# Patient Record
Sex: FEMALE | URBAN - METROPOLITAN AREA
[De-identification: names, ages, dates, MRNs, and addresses within clinical notes are randomized per-mention and may not be internally consistent; named-entity substitution may affect disease eponyms.]

---

## 2024-07-08 ENCOUNTER — LAB (OUTPATIENT)
Dept: LAB | Facility: LAB | Age: 69
End: 2024-07-08
Payer: COMMERCIAL

## 2024-07-08 DIAGNOSIS — Z01.818 ENCOUNTER FOR OTHER PREPROCEDURAL EXAMINATION: Primary | ICD-10-CM

## 2024-07-08 LAB
ANION GAP SERPL CALC-SCNC: 16 MMOL/L (ref 10–20)
BASOPHILS # BLD AUTO: 0.04 X10*3/UL (ref 0–0.1)
BASOPHILS NFR BLD AUTO: 0.6 %
BUN SERPL-MCNC: 22 MG/DL (ref 6–23)
CALCIUM SERPL-MCNC: 9.8 MG/DL (ref 8.6–10.6)
CHLORIDE SERPL-SCNC: 99 MMOL/L (ref 98–107)
CO2 SERPL-SCNC: 29 MMOL/L (ref 21–32)
CREAT SERPL-MCNC: 0.71 MG/DL (ref 0.5–1.05)
EGFRCR SERPLBLD CKD-EPI 2021: >90 ML/MIN/1.73M*2
EOSINOPHIL # BLD AUTO: 0.04 X10*3/UL (ref 0–0.7)
EOSINOPHIL NFR BLD AUTO: 0.6 %
ERYTHROCYTE [DISTWIDTH] IN BLOOD BY AUTOMATED COUNT: 12.4 % (ref 11.5–14.5)
GLUCOSE SERPL-MCNC: 90 MG/DL (ref 74–99)
HCT VFR BLD AUTO: 45.2 % (ref 36–46)
HGB BLD-MCNC: 14.5 G/DL (ref 12–16)
IMM GRANULOCYTES # BLD AUTO: 0.01 X10*3/UL (ref 0–0.7)
IMM GRANULOCYTES NFR BLD AUTO: 0.2 % (ref 0–0.9)
LYMPHOCYTES # BLD AUTO: 1.05 X10*3/UL (ref 1.2–4.8)
LYMPHOCYTES NFR BLD AUTO: 17 %
MCH RBC QN AUTO: 30.5 PG (ref 26–34)
MCHC RBC AUTO-ENTMCNC: 32.1 G/DL (ref 32–36)
MCV RBC AUTO: 95 FL (ref 80–100)
MONOCYTES # BLD AUTO: 0.36 X10*3/UL (ref 0.1–1)
MONOCYTES NFR BLD AUTO: 5.8 %
NEUTROPHILS # BLD AUTO: 4.66 X10*3/UL (ref 1.2–7.7)
NEUTROPHILS NFR BLD AUTO: 75.8 %
NRBC BLD-RTO: 0 /100 WBCS (ref 0–0)
PLATELET # BLD AUTO: 242 X10*3/UL (ref 150–450)
POTASSIUM SERPL-SCNC: 4.3 MMOL/L (ref 3.5–5.3)
RBC # BLD AUTO: 4.76 X10*6/UL (ref 4–5.2)
SODIUM SERPL-SCNC: 140 MMOL/L (ref 136–145)
WBC # BLD AUTO: 6.2 X10*3/UL (ref 4.4–11.3)

## 2024-07-08 PROCEDURE — 36415 COLL VENOUS BLD VENIPUNCTURE: CPT

## 2024-07-08 PROCEDURE — 80048 BASIC METABOLIC PNL TOTAL CA: CPT

## 2024-07-08 PROCEDURE — 85025 COMPLETE CBC W/AUTO DIFF WBC: CPT

## 2025-07-11 ENCOUNTER — OFFICE VISIT (OUTPATIENT)
Dept: URGENT CARE | Age: 70
End: 2025-07-11
Payer: MEDICARE

## 2025-07-11 VITALS
SYSTOLIC BLOOD PRESSURE: 126 MMHG | BODY MASS INDEX: 22.2 KG/M2 | RESPIRATION RATE: 16 BRPM | WEIGHT: 130 LBS | DIASTOLIC BLOOD PRESSURE: 85 MMHG | HEIGHT: 64 IN | TEMPERATURE: 98.4 F | HEART RATE: 83 BPM | OXYGEN SATURATION: 99 %

## 2025-07-11 DIAGNOSIS — S60.459A FOREIGN BODY IN SKIN OF FINGER, INITIAL ENCOUNTER: Primary | ICD-10-CM

## 2025-07-11 RX ORDER — MESALAMINE 1.2 G/1
4.8 TABLET, DELAYED RELEASE ORAL DAILY
COMMUNITY

## 2025-07-11 RX ORDER — HYDROXYZINE HYDROCHLORIDE 25 MG/1
25 TABLET, FILM COATED ORAL EVERY 8 HOURS PRN
COMMUNITY

## 2025-07-11 RX ORDER — VIT A/VIT C/VIT E/ZINC/COPPER 4296-226
2 CAPSULE ORAL
COMMUNITY

## 2025-07-11 RX ORDER — CEPHALEXIN 500 MG/1
500 CAPSULE ORAL 2 TIMES DAILY
Qty: 14 CAPSULE | Refills: 0 | Status: SHIPPED | OUTPATIENT
Start: 2025-07-11 | End: 2025-07-18

## 2025-07-11 ASSESSMENT — PAIN SCALES - GENERAL: PAINLEVEL_OUTOF10: 3

## 2025-07-11 ASSESSMENT — PATIENT HEALTH QUESTIONNAIRE - PHQ9
2. FEELING DOWN, DEPRESSED OR HOPELESS: NOT AT ALL
SUM OF ALL RESPONSES TO PHQ9 QUESTIONS 1 AND 2: 0
1. LITTLE INTEREST OR PLEASURE IN DOING THINGS: NOT AT ALL

## 2025-07-11 ASSESSMENT — ENCOUNTER SYMPTOMS: WOUND: 1

## 2025-07-11 NOTE — PATIENT INSTRUCTIONS
Warm soaks several times a day   Avoid digging for splinter as you could introduce infection   Keflex twice daily   Follow up with hand

## 2025-07-11 NOTE — PROGRESS NOTES
"Subjective   Patient ID: Laurence Li is a 70 y.o. female. They present today with a chief complaint of Other (Patient states she has a splinter in her right middle finger under the nail bed x 1 day ).    History of Present Illness  Patient is a 70 year old female presenting for evaluation of a left finger concern. Yesterday scraped her finger against a doorway and a piece of paint became stuck under her finger nail. She tried to remove it but ended up pushing it further under the nail. She tried warm soaks without relief. Reports tdap updated less than a year ago.       History provided by:  Patient   used: No        Past Medical History  Allergies as of 07/11/2025 - Reviewed 07/11/2025   Allergen Reaction Noted    Gabapentin GI intolerance, Unknown, and Nausea Only 04/23/2019    Morphine GI intolerance, Other, and Nausea Only 02/21/2011       Prescriptions Prior to Admission[1]     Medical History[2]    Surgical History[3]     reports that she has quit smoking. Her smoking use included cigarettes. She has never used smokeless tobacco. She reports that she does not use drugs.    Review of Systems  Review of Systems   Skin:  Positive for wound.                                  Objective    Vitals:    07/11/25 1149   BP: 126/85   Pulse: 83   Resp: 16   Temp: 36.9 °C (98.4 °F)   SpO2: 99%   Weight: 59 kg (130 lb)   Height: 1.626 m (5' 4\")     No LMP recorded (lmp unknown). Patient is postmenopausal.    Physical Exam  Constitutional:       General: She is not in acute distress.     Appearance: Normal appearance. She is normal weight. She is not ill-appearing or toxic-appearing.   HENT:      Head: Normocephalic.   Eyes:      General:         Right eye: No discharge.         Left eye: No discharge.      Conjunctiva/sclera: Conjunctivae normal.   Neck:      Trachea: Phonation normal.   Cardiovascular:      Rate and Rhythm: Normal rate.   Pulmonary:      Effort: No respiratory distress.      Breath " sounds: Normal breath sounds. No stridor. No wheezing.   Skin:     Comments: Splinter present entirely under the left middle finger nail    Neurological:      Mental Status: She is alert.   Psychiatric:         Mood and Affect: Mood normal.         Behavior: Behavior normal.         Thought Content: Thought content normal.         Procedures    Point of Care Test & Imaging Results from this visit  No results found for this visit on 07/11/25.   Imaging  No results found.    Cardiology, Vascular, and Other Imaging  No other imaging results found for the past 2 days      Diagnostic study results (if any) were reviewed by Madison Fuentes PA-C.    Assessment/Plan   Allergies, medications, history, and pertinent labs/EKGs/Imaging reviewed by Madison Fuentes PA-C.     Medical Decision Making  Patient is a 70 year old female presenting for evaluation of a splinter. Hemodynamically stable.  There is a splinter deep underneath the left middle fingernail. Will refer to hand and cover with keflex. Can continue warm soaks. Avoid picking or further attempts to remove as this can push it further and cause infection.     At time of discharge, patient was clinically well-appearing and appropriate for outpatient management. The patient/parent/guardian was educated regarding diagnosis, supportive care, OTC and Rx medications. The patient/parent/guardian was given the opportunity to ask questions prior to discharge. They verbalized understanding of discussion of treatment plan, expected course of illness and/or injury, indications on when to return to , when to seek further evaluation in ED/call 911, and the need to follow up with PCP and/or specialist as referred. Patient/parent/guardian was provided with work/school documentation if requested. Patient stable upon discharge.     Orders and Diagnoses  Diagnoses and all orders for this visit:  Foreign body in skin of finger, initial encounter  -     cephalexin (Keflex) 500 mg  capsule; Take 1 capsule (500 mg) by mouth 2 times a day for 7 days.  -     Referral to Orthopedics and Sports Medicine; Future      Medical Admin Record      Patient disposition: Home    Electronically signed by Madison Fuentes PA-C  12:01 PM           [1] (Not in a hospital admission)  [2] No past medical history on file.  [3] No past surgical history on file.

## 2025-07-15 ENCOUNTER — OFFICE VISIT (OUTPATIENT)
Dept: ORTHOPEDIC SURGERY | Facility: HOSPITAL | Age: 70
End: 2025-07-15
Payer: MEDICARE

## 2025-07-15 DIAGNOSIS — S60.459A FOREIGN BODY IN SKIN OF FINGER, INITIAL ENCOUNTER: ICD-10-CM

## 2025-07-15 PROCEDURE — 99202 OFFICE O/P NEW SF 15 MIN: CPT | Mod: 25 | Performed by: STUDENT IN AN ORGANIZED HEALTH CARE EDUCATION/TRAINING PROGRAM

## 2025-07-15 PROCEDURE — 1159F MED LIST DOCD IN RCRD: CPT | Performed by: STUDENT IN AN ORGANIZED HEALTH CARE EDUCATION/TRAINING PROGRAM

## 2025-07-15 PROCEDURE — 1125F AMNT PAIN NOTED PAIN PRSNT: CPT | Performed by: STUDENT IN AN ORGANIZED HEALTH CARE EDUCATION/TRAINING PROGRAM

## 2025-07-15 PROCEDURE — 99203 OFFICE O/P NEW LOW 30 MIN: CPT | Performed by: STUDENT IN AN ORGANIZED HEALTH CARE EDUCATION/TRAINING PROGRAM

## 2025-07-15 PROCEDURE — 10120 INC&RMVL FB SUBQ TISS SMPL: CPT | Performed by: STUDENT IN AN ORGANIZED HEALTH CARE EDUCATION/TRAINING PROGRAM

## 2025-07-15 ASSESSMENT — PAIN SCALES - GENERAL: PAINLEVEL_OUTOF10: 3

## 2025-07-15 ASSESSMENT — PAIN DESCRIPTION - DESCRIPTORS: DESCRIPTORS: DISCOMFORT;SORE

## 2025-07-15 ASSESSMENT — PAIN - FUNCTIONAL ASSESSMENT: PAIN_FUNCTIONAL_ASSESSMENT: 0-10

## 2025-07-16 PROCEDURE — 10120 INC&RMVL FB SUBQ TISS SMPL: CPT | Performed by: STUDENT IN AN ORGANIZED HEALTH CARE EDUCATION/TRAINING PROGRAM

## 2025-07-16 NOTE — PROGRESS NOTES
History of Present Illness   Patient presents today for evaluation of side: right upper extremity pain.    The patient sustained an acute injury on 7/10/2025.  She notes that a splinter came off a door way and embedded into the right long finger under the nail.  She went to urgent care they cannot remove the splinter.  She has been doing soaks with warm soapy water.  She has been taking antibiotics.  She denies any erythema warmth or drainage.  The patient denies any loss of consciousness or additional significant injuries.  The patient denies any current numbness or tingling.  The pain is mild     Medical History[1]    Medication Documentation Review Audit       Reviewed by Franchesca Banuelos LPN (Licensed Nurse) on 07/15/25 at 1111      Medication Order Taking? Sig Documenting Provider Last Dose Status   CALCIUM ORAL [id number]  Take 500 mg by mouth 3 times a day. Historical Provider, MD  Active   cephalexin (Keflex) 500 mg capsule [id number]  Take 1 capsule (500 mg) by mouth 2 times a day for 7 days. Madison Fuentes PA-C  Active   hydrOXYzine HCL (Atarax) 25 mg tablet [id number]  Take 25 mg by mouth every 8 hours if needed for anxiety. Historical Provider, MD  Active   mesalamine (Lialda) 1.2 gram EC tablet [id number]  Take 4.8 g by mouth once daily. Historical Provider, MD  Active   vitamins A,C,E-zinc-copper (PreserVision AREDS) 4,296 mcg-226 mg-90 mg capsule [id number]  Take 2 tablets by mouth once daily. Historical Provider, MD  Active                    RX Allergies[2]    Social History     Socioeconomic History    Marital status:      Spouse name: Not on file    Number of children: Not on file    Years of education: Not on file    Highest education level: Not on file   Occupational History    Not on file   Tobacco Use    Smoking status: Former     Types: Cigarettes    Smokeless tobacco: Never   Vaping Use    Vaping status: Never Used   Substance and Sexual Activity    Alcohol use: Not on file    Drug use:  Never    Sexual activity: Not on file   Other Topics Concern    Not on file   Social History Narrative    Not on file     Social Drivers of Health     Financial Resource Strain: Low Risk  (11/18/2023)    Received from [facility]    Overall Financial Resource Strain (CARDIA)     Difficulty of Paying Living Expenses: Not hard at all   Food Insecurity: No Food Insecurity (11/18/2023)    Received from [facility]    Hunger Vital Sign     Worried About Running Out of Food in the Last Year: Never true     Ran Out of Food in the Last Year: Never true   Transportation Needs: No Transportation Needs (11/18/2023)    Received from [facility]    PRAPARE - Transportation     Lack of Transportation (Medical): No     Lack of Transportation (Non-Medical): No   Physical Activity: Sufficiently Active (11/18/2023)    Received from [facility]    Exercise Vital Sign     Days of Exercise per Week: 7 days     Minutes of Exercise per Session: 60 min   Stress: No Stress Concern Present (11/18/2023)    Received from [facility]    [employer] [place] [facility] [employer] - Occupational Stress Questionnaire     Feeling of Stress : Not at all   Social Connections: Moderately Isolated (11/18/2023)    Received from [facility]    Social Connection and Isolation Panel     Frequency of Communication with Friends and Family: More than three times a week     Frequency of Social Gatherings with Friends and Family: More than three times a week     Attends Sabianist Services: 1 to 4 times per year     Active Member of Clubs or Organizations: No     Attends Club or Organization Meetings: Never     Marital Status:    Intimate Partner Violence: Not At Risk (11/18/2023)    Received from [facility]    Humiliation, Afraid, Rape, and Kick questionnaire     Fear of Current or Ex-Partner: No     Emotionally Abused: No     Physically Abused: No     Sexually Abused: No   Housing Stability: Low Risk  (11/18/2023)    Received from [facility]    Housing  Stability Vital Sign     Unable to Pay for Housing in the Last Year: No     Number of Places Lived in the Last Year: 1     Unstable Housing in the Last Year: No       Surgical History[3]       Review of Systems   GENERAL: Negative  GI: Negative  MUSCULOSKELETAL: See HPI  SKIN: Negative  NEURO:  Negative     Physical Exam:  side: right upper extremity: Long finger  There is embedded splinter underneath her nail.  No drainage present  Swelling / ecchymosis noted  Intact flexion and extension of 1st IP joint and finger abduction  Sensation intact to light touch medial / ulnar and radial nerve distribution   Good cap refill     Imaging  None     Assessment   Patient with an right long finger splinter.     Plan:  Informed consent was obtained.  A digital block was performed to the long finger.  The finger was prepped in normal sterile fashion with ChloraPrep.  With a 20-gauge needle the splinter was removed underneath the nail.  The splinter was removed in its entirety.  This is 5 mm x 2 mm.  A sterile dressing was applied.  She is to continue with her twice daily soaks and finish her antibiotics     Follow-up as needed         FOREIGN BODY REMOVAL - EMBEDDED [PRO84]    Date/Time: 7/16/2025 10:39 AM    Performed by: Victor Manuel Bautista DO  Authorized by: Victor Manuel Bautista DO    Consent:     Consent obtained:  Verbal    Consent given by:  Patient    Risks, benefits, and alternatives were discussed: yes    Universal protocol:     Procedure explained and questions answered to patient or proxy's satisfaction: yes      Relevant documents present and verified: yes      Immediately prior to procedure, a time out was called: yes      Patient identity confirmed:  Verbally with patient  Location:     Location:  Hand    Hand location:  L hand dorsum    Depth:  Intradermal    Tendon involvement:  None  Pre-procedure details:     Imaging:  None  Anesthesia:     Anesthesia method:  Nerve block    Block needle gauge:  25 G    Block  anesthetic:  Lidocaine 1% w/o epi    Block outcome:  Anesthesia achieved  Procedure type:     Procedure complexity:  Simple  Procedure details:     Localization method:  Probed    Dissection of underlying tissues: no      Bloodless field: yes      Removal mechanism:  Forceps    Foreign bodies recovered:  1  Post-procedure details:     Neurovascular status: intact      Confirmation:  No additional foreign bodies on visualization    Skin closure:  None    Procedure completion:  Tolerated              [1] No past medical history on file.  [2]   Allergies  Allergen Reactions    Gabapentin GI intolerance, Unknown and Nausea Only     dizzy    Morphine GI intolerance, Other and Nausea Only   [3] No past surgical history on file.